# Patient Record
Sex: FEMALE | Race: WHITE | URBAN - METROPOLITAN AREA
[De-identification: names, ages, dates, MRNs, and addresses within clinical notes are randomized per-mention and may not be internally consistent; named-entity substitution may affect disease eponyms.]

---

## 2018-12-02 ENCOUNTER — EMERGENCY (EMERGENCY)
Facility: HOSPITAL | Age: 53
LOS: 0 days | Discharge: HOME | End: 2018-12-03
Attending: EMERGENCY MEDICINE

## 2018-12-02 VITALS
RESPIRATION RATE: 18 BRPM | TEMPERATURE: 98 F | OXYGEN SATURATION: 99 % | DIASTOLIC BLOOD PRESSURE: 89 MMHG | HEART RATE: 88 BPM | SYSTOLIC BLOOD PRESSURE: 136 MMHG

## 2018-12-02 DIAGNOSIS — M54.9 DORSALGIA, UNSPECIFIED: ICD-10-CM

## 2018-12-02 DIAGNOSIS — C50.919 MALIGNANT NEOPLASM OF UNSPECIFIED SITE OF UNSPECIFIED FEMALE BREAST: ICD-10-CM

## 2018-12-02 DIAGNOSIS — R07.89 OTHER CHEST PAIN: ICD-10-CM

## 2018-12-02 DIAGNOSIS — R07.9 CHEST PAIN, UNSPECIFIED: ICD-10-CM

## 2018-12-02 LAB
ALBUMIN SERPL ELPH-MCNC: 4.4 G/DL — SIGNIFICANT CHANGE UP (ref 3.5–5.2)
ALP SERPL-CCNC: 51 U/L — SIGNIFICANT CHANGE UP (ref 30–115)
ALT FLD-CCNC: 18 U/L — SIGNIFICANT CHANGE UP (ref 0–41)
ANION GAP SERPL CALC-SCNC: 13 MMOL/L — SIGNIFICANT CHANGE UP (ref 7–14)
AST SERPL-CCNC: 19 U/L — SIGNIFICANT CHANGE UP (ref 0–41)
BASOPHILS # BLD AUTO: 0.03 K/UL — SIGNIFICANT CHANGE UP (ref 0–0.2)
BASOPHILS NFR BLD AUTO: 0.6 % — SIGNIFICANT CHANGE UP (ref 0–1)
BILIRUB SERPL-MCNC: 0.6 MG/DL — SIGNIFICANT CHANGE UP (ref 0.2–1.2)
BUN SERPL-MCNC: 11 MG/DL — SIGNIFICANT CHANGE UP (ref 10–20)
CALCIUM SERPL-MCNC: 9.5 MG/DL — SIGNIFICANT CHANGE UP (ref 8.5–10.1)
CHLORIDE SERPL-SCNC: 101 MMOL/L — SIGNIFICANT CHANGE UP (ref 98–110)
CO2 SERPL-SCNC: 27 MMOL/L — SIGNIFICANT CHANGE UP (ref 17–32)
CREAT SERPL-MCNC: 0.7 MG/DL — SIGNIFICANT CHANGE UP (ref 0.7–1.5)
D DIMER BLD IA.RAPID-MCNC: 41 NG/ML DDU — SIGNIFICANT CHANGE UP (ref 0–230)
EOSINOPHIL # BLD AUTO: 0.11 K/UL — SIGNIFICANT CHANGE UP (ref 0–0.7)
EOSINOPHIL NFR BLD AUTO: 2.1 % — SIGNIFICANT CHANGE UP (ref 0–8)
GLUCOSE SERPL-MCNC: 86 MG/DL — SIGNIFICANT CHANGE UP (ref 70–99)
HCG SERPL QL: NEGATIVE — SIGNIFICANT CHANGE UP
HCT VFR BLD CALC: 43.8 % — SIGNIFICANT CHANGE UP (ref 37–47)
HGB BLD-MCNC: 14.7 G/DL — SIGNIFICANT CHANGE UP (ref 12–16)
IMM GRANULOCYTES NFR BLD AUTO: 0.2 % — SIGNIFICANT CHANGE UP (ref 0.1–0.3)
LYMPHOCYTES # BLD AUTO: 1.13 K/UL — LOW (ref 1.2–3.4)
LYMPHOCYTES # BLD AUTO: 21.7 % — SIGNIFICANT CHANGE UP (ref 20.5–51.1)
MCHC RBC-ENTMCNC: 29.7 PG — SIGNIFICANT CHANGE UP (ref 27–31)
MCHC RBC-ENTMCNC: 33.6 G/DL — SIGNIFICANT CHANGE UP (ref 32–37)
MCV RBC AUTO: 88.5 FL — SIGNIFICANT CHANGE UP (ref 81–99)
MONOCYTES # BLD AUTO: 0.45 K/UL — SIGNIFICANT CHANGE UP (ref 0.1–0.6)
MONOCYTES NFR BLD AUTO: 8.7 % — SIGNIFICANT CHANGE UP (ref 1.7–9.3)
NEUTROPHILS # BLD AUTO: 3.47 K/UL — SIGNIFICANT CHANGE UP (ref 1.4–6.5)
NEUTROPHILS NFR BLD AUTO: 66.7 % — SIGNIFICANT CHANGE UP (ref 42.2–75.2)
NT-PROBNP SERPL-SCNC: 89 PG/ML — SIGNIFICANT CHANGE UP (ref 0–300)
PLATELET # BLD AUTO: 294 K/UL — SIGNIFICANT CHANGE UP (ref 130–400)
POTASSIUM SERPL-MCNC: 4.4 MMOL/L — SIGNIFICANT CHANGE UP (ref 3.5–5)
POTASSIUM SERPL-SCNC: 4.4 MMOL/L — SIGNIFICANT CHANGE UP (ref 3.5–5)
PROT SERPL-MCNC: 7.1 G/DL — SIGNIFICANT CHANGE UP (ref 6–8)
RBC # BLD: 4.95 M/UL — SIGNIFICANT CHANGE UP (ref 4.2–5.4)
RBC # FLD: 12.4 % — SIGNIFICANT CHANGE UP (ref 11.5–14.5)
SODIUM SERPL-SCNC: 141 MMOL/L — SIGNIFICANT CHANGE UP (ref 135–146)
TROPONIN T SERPL-MCNC: <0.01 NG/ML — SIGNIFICANT CHANGE UP
TROPONIN T SERPL-MCNC: <0.01 NG/ML — SIGNIFICANT CHANGE UP
WBC # BLD: 5.2 K/UL — SIGNIFICANT CHANGE UP (ref 4.8–10.8)
WBC # FLD AUTO: 5.2 K/UL — SIGNIFICANT CHANGE UP (ref 4.8–10.8)

## 2018-12-02 RX ORDER — ASPIRIN/CALCIUM CARB/MAGNESIUM 324 MG
325 TABLET ORAL ONCE
Qty: 0 | Refills: 0 | Status: COMPLETED | OUTPATIENT
Start: 2018-12-02 | End: 2018-12-02

## 2018-12-02 RX ORDER — TAMOXIFEN CITRATE 20 MG/1
20 TABLET, FILM COATED ORAL AT BEDTIME
Qty: 0 | Refills: 0 | Status: DISCONTINUED | OUTPATIENT
Start: 2018-12-02 | End: 2018-12-03

## 2018-12-02 RX ADMIN — Medication 325 MILLIGRAM(S): at 10:37

## 2018-12-02 NOTE — ED PROVIDER NOTE - ATTENDING CONTRIBUTION TO CARE
Agree w/ above.  IMP: back pain and chest discomfort.  Does not seem like dissection; pe and acs seem unlikely.  P: ekg, cxr, cbc, cmp, ddimer, ce, reassess.

## 2018-12-02 NOTE — ED PROVIDER NOTE - NS ED ROS FT
Constitutional: No fever  Eyes:  No visual changes  ENMT: No neck pain or stiffness  Cardiac:  See HPI  Respiratory:  No cough or respiratory distress.   GI:  No nausea, vomiting, diarrhea or abdominal pain.  :  No dysuria, frequency or burning.  MS:  See HPI  Neuro:  No headache   Skin:  No skin rash  Except as documented in the HPI,  all other systems are negative

## 2018-12-02 NOTE — ED ADULT NURSE REASSESSMENT NOTE - NS ED NURSE REASSESS COMMENT FT1
. Patient is alert,oriented . VSS . On continous cardiac monitering . Denied any pain now .Will continue to moniter

## 2018-12-02 NOTE — ED PROVIDER NOTE - MEDICAL DECISION MAKING DETAILS
trop neg. ddimer neg. ekg non ischemic.  cxr wnl.  Pt continued to have chest "fluttering" and back discomfort, though improved.  Pt placed in OBS for further w/up in light of diagnostic uncertainty.  Discussed all available results.  Pt and/ or family understand results and agree w/ plan of care.

## 2018-12-02 NOTE — ED CDU PROVIDER INITIAL DAY NOTE - PROGRESS NOTE DETAILS
Pt endorsed to Dr. Kaur for further care. One week of occas back pain and chest pains. Pt has some parathoracic tenderness. S1S2 rrr lungs clear, no ext swelling and neuro intact. EKG non ischemic, normal xray. CE neg x2 . CCTA in the am. received signout from Dr. Kaur - pt in obs for cp/back; currently pain free; ce/ekg unchanged x 2; will plan for ccta in am;

## 2018-12-02 NOTE — ED CDU PROVIDER INITIAL DAY NOTE - OBJECTIVE STATEMENT
54 y/o F pmh early stage breast CA s/p lumpectomy, on tamoxifen, p/w intermittent back pain x1wk.  Pain worse supine, better seated.  + "fluttering" in chest today, prompting ED visit.  No n/v, cough, fever, leg swelling, travel, neck pain, neuro deficit.  gradual onset w/o hx trauma.  No prior cardiac w/up.  sx moderated.

## 2018-12-02 NOTE — ED ADULT NURSE NOTE - OBJECTIVE STATEMENT
Patient c/o chest pressure and pain in between her shoulder blades intermittently x 1 week. Stated she went out last night and mixed sushi and wine and had 1 episode of vomiting but is not sure if it is related.   Patient denies SOB Fevers chills, head ache visual changes.

## 2018-12-02 NOTE — ED PROVIDER NOTE - PHYSICAL EXAMINATION
CONSTITUTIONAL: NAD  SKIN: Warm dry  HEAD: NCAT  EYES: NL inspection  ENT: MMM  NECK: Supple; non tender.  CARD: RRR, 2+ pulses x 4 extrem, symmetric  RESP: CTAB  ABD: S/NT no R/G  BACK: Nl inspection, non ttp  EXT: no pedal edema  NEURO: Grossly unremarkable  PSYCH: Cooperative

## 2018-12-02 NOTE — ED CDU PROVIDER INITIAL DAY NOTE - MEDICAL DECISION MAKING DETAILS
Obs care continued by me, Dr. Oconnor. No further CDU events. 2nd trop neg.  ekg unchanged, non ischemic. Will cont to monitor.

## 2018-12-02 NOTE — ED PROVIDER NOTE - OBJECTIVE STATEMENT
52 y/o F pmh early stage breast CA s/p lumpectomy, on tamoxifen, p/w intermittent back pain x1wk.  Pain worse supine, better seated.  + "fluttering" in chest today, prompting ED visit.  No n/v, cough, fever, leg swelling, travel, neck pain, neuro deficit.  gradual onset w/o hx trauma.  No prior cardiac w/up.  sx moderated.

## 2018-12-03 VITALS
DIASTOLIC BLOOD PRESSURE: 74 MMHG | SYSTOLIC BLOOD PRESSURE: 124 MMHG | HEART RATE: 84 BPM | TEMPERATURE: 96 F | RESPIRATION RATE: 18 BRPM | OXYGEN SATURATION: 98 %

## 2018-12-03 RX ORDER — METOPROLOL TARTRATE 50 MG
50 TABLET ORAL ONCE
Qty: 0 | Refills: 0 | Status: COMPLETED | OUTPATIENT
Start: 2018-12-03 | End: 2018-12-03

## 2018-12-03 RX ORDER — KETOROLAC TROMETHAMINE 30 MG/ML
30 SYRINGE (ML) INJECTION ONCE
Qty: 0 | Refills: 0 | Status: DISCONTINUED | OUTPATIENT
Start: 2018-12-03 | End: 2018-12-03

## 2018-12-03 RX ADMIN — Medication 30 MILLIGRAM(S): at 11:35

## 2018-12-03 RX ADMIN — Medication 50 MILLIGRAM(S): at 08:17

## 2018-12-03 RX ADMIN — TAMOXIFEN CITRATE 20 MILLIGRAM(S): 20 TABLET, FILM COATED ORAL at 03:24

## 2018-12-03 NOTE — ED CDU PROVIDER SUBSEQUENT DAY NOTE - HISTORY
pt resting in obs without complaints; ce/trop negative x 2; will start with lopressor 50mg po this am - ccta

## 2018-12-03 NOTE — ED CDU PROVIDER DISPOSITION NOTE - CLINICAL COURSE
Patient was sent to EDOU for further evaluation of atypical chest pains, has remained in no distress and with stable vitals, ekgs times two no evidence of ischemic changes, enzymes times two normal, CCTA read as normal coronaries, Copies of all blood work and other studies were given to patient and family, will fallow up with both PMD and GI

## 2023-11-17 PROBLEM — Z00.00 ENCOUNTER FOR PREVENTIVE HEALTH EXAMINATION: Status: ACTIVE | Noted: 2023-11-17

## 2024-05-14 PROBLEM — C50.919 MALIGNANT NEOPLASM OF UNSPECIFIED SITE OF UNSPECIFIED FEMALE BREAST: Chronic | Status: ACTIVE | Noted: 2018-12-02

## 2024-05-16 ENCOUNTER — OUTPATIENT (OUTPATIENT)
Dept: OUTPATIENT SERVICES | Facility: HOSPITAL | Age: 59
LOS: 1 days | End: 2024-05-16
Payer: COMMERCIAL

## 2024-05-16 DIAGNOSIS — R10.84 GENERALIZED ABDOMINAL PAIN: ICD-10-CM

## 2024-05-16 DIAGNOSIS — Z00.8 ENCOUNTER FOR OTHER GENERAL EXAMINATION: ICD-10-CM

## 2024-05-16 PROCEDURE — 76856 US EXAM PELVIC COMPLETE: CPT | Mod: 26

## 2024-05-16 PROCEDURE — 76856 US EXAM PELVIC COMPLETE: CPT

## 2024-05-16 PROCEDURE — 76700 US EXAM ABDOM COMPLETE: CPT | Mod: 26

## 2024-05-16 PROCEDURE — 76700 US EXAM ABDOM COMPLETE: CPT

## 2024-05-17 ENCOUNTER — EMERGENCY (EMERGENCY)
Facility: HOSPITAL | Age: 59
LOS: 0 days | Discharge: HOME | End: 2024-05-17
Attending: EMERGENCY MEDICINE
Payer: COMMERCIAL

## 2024-05-17 VITALS
DIASTOLIC BLOOD PRESSURE: 94 MMHG | OXYGEN SATURATION: 100 % | HEART RATE: 86 BPM | TEMPERATURE: 98 F | RESPIRATION RATE: 18 BRPM | SYSTOLIC BLOOD PRESSURE: 159 MMHG

## 2024-05-17 DIAGNOSIS — R10.31 RIGHT LOWER QUADRANT PAIN: ICD-10-CM

## 2024-05-17 DIAGNOSIS — R10.84 GENERALIZED ABDOMINAL PAIN: ICD-10-CM

## 2024-05-17 DIAGNOSIS — Z90.49 ACQUIRED ABSENCE OF OTHER SPECIFIED PARTS OF DIGESTIVE TRACT: ICD-10-CM

## 2024-05-17 DIAGNOSIS — Z87.891 PERSONAL HISTORY OF NICOTINE DEPENDENCE: ICD-10-CM

## 2024-05-17 DIAGNOSIS — I44.4 LEFT ANTERIOR FASCICULAR BLOCK: ICD-10-CM

## 2024-05-17 DIAGNOSIS — E78.00 PURE HYPERCHOLESTEROLEMIA, UNSPECIFIED: ICD-10-CM

## 2024-05-17 DIAGNOSIS — R10.13 EPIGASTRIC PAIN: ICD-10-CM

## 2024-05-17 DIAGNOSIS — R10.32 LEFT LOWER QUADRANT PAIN: ICD-10-CM

## 2024-05-17 DIAGNOSIS — R00.2 PALPITATIONS: ICD-10-CM

## 2024-05-17 LAB
ALBUMIN SERPL ELPH-MCNC: 4.5 G/DL — SIGNIFICANT CHANGE UP (ref 3.5–5.2)
ALP SERPL-CCNC: 71 U/L — SIGNIFICANT CHANGE UP (ref 30–115)
ALT FLD-CCNC: 21 U/L — SIGNIFICANT CHANGE UP (ref 0–41)
ANION GAP SERPL CALC-SCNC: 10 MMOL/L — SIGNIFICANT CHANGE UP (ref 7–14)
APPEARANCE UR: CLEAR — SIGNIFICANT CHANGE UP
AST SERPL-CCNC: 20 U/L — SIGNIFICANT CHANGE UP (ref 0–41)
BACTERIA # UR AUTO: NEGATIVE /HPF — SIGNIFICANT CHANGE UP
BASOPHILS # BLD AUTO: 0.01 K/UL — SIGNIFICANT CHANGE UP (ref 0–0.2)
BASOPHILS NFR BLD AUTO: 0.2 % — SIGNIFICANT CHANGE UP (ref 0–1)
BILIRUB SERPL-MCNC: 0.9 MG/DL — SIGNIFICANT CHANGE UP (ref 0.2–1.2)
BILIRUB UR-MCNC: NEGATIVE — SIGNIFICANT CHANGE UP
BUN SERPL-MCNC: 11 MG/DL — SIGNIFICANT CHANGE UP (ref 10–20)
CALCIUM SERPL-MCNC: 9.5 MG/DL — SIGNIFICANT CHANGE UP (ref 8.4–10.5)
CAST: 0 /LPF — SIGNIFICANT CHANGE UP (ref 0–4)
CHLORIDE SERPL-SCNC: 103 MMOL/L — SIGNIFICANT CHANGE UP (ref 98–110)
CO2 SERPL-SCNC: 25 MMOL/L — SIGNIFICANT CHANGE UP (ref 17–32)
COLOR SPEC: YELLOW — SIGNIFICANT CHANGE UP
CREAT SERPL-MCNC: 0.7 MG/DL — SIGNIFICANT CHANGE UP (ref 0.7–1.5)
DIFF PNL FLD: NEGATIVE — SIGNIFICANT CHANGE UP
EGFR: 101 ML/MIN/1.73M2 — SIGNIFICANT CHANGE UP
EOSINOPHIL # BLD AUTO: 0.13 K/UL — SIGNIFICANT CHANGE UP (ref 0–0.7)
EOSINOPHIL NFR BLD AUTO: 2.3 % — SIGNIFICANT CHANGE UP (ref 0–8)
GLUCOSE SERPL-MCNC: 98 MG/DL — SIGNIFICANT CHANGE UP (ref 70–99)
GLUCOSE UR QL: NEGATIVE MG/DL — SIGNIFICANT CHANGE UP
HCT VFR BLD CALC: 41.7 % — SIGNIFICANT CHANGE UP (ref 37–47)
HGB BLD-MCNC: 14.5 G/DL — SIGNIFICANT CHANGE UP (ref 12–16)
IMM GRANULOCYTES NFR BLD AUTO: 0.2 % — SIGNIFICANT CHANGE UP (ref 0.1–0.3)
KETONES UR-MCNC: NEGATIVE MG/DL — SIGNIFICANT CHANGE UP
LACTATE SERPL-SCNC: 1.2 MMOL/L — SIGNIFICANT CHANGE UP (ref 0.7–2)
LEUKOCYTE ESTERASE UR-ACNC: ABNORMAL
LIDOCAIN IGE QN: 20 U/L — SIGNIFICANT CHANGE UP (ref 7–60)
LYMPHOCYTES # BLD AUTO: 0.9 K/UL — LOW (ref 1.2–3.4)
LYMPHOCYTES # BLD AUTO: 16.2 % — LOW (ref 20.5–51.1)
MCHC RBC-ENTMCNC: 31 PG — SIGNIFICANT CHANGE UP (ref 27–31)
MCHC RBC-ENTMCNC: 34.8 G/DL — SIGNIFICANT CHANGE UP (ref 32–37)
MCV RBC AUTO: 89.1 FL — SIGNIFICANT CHANGE UP (ref 81–99)
MONOCYTES # BLD AUTO: 0.34 K/UL — SIGNIFICANT CHANGE UP (ref 0.1–0.6)
MONOCYTES NFR BLD AUTO: 6.1 % — SIGNIFICANT CHANGE UP (ref 1.7–9.3)
NEUTROPHILS # BLD AUTO: 4.16 K/UL — SIGNIFICANT CHANGE UP (ref 1.4–6.5)
NEUTROPHILS NFR BLD AUTO: 75 % — SIGNIFICANT CHANGE UP (ref 42.2–75.2)
NITRITE UR-MCNC: NEGATIVE — SIGNIFICANT CHANGE UP
NRBC # BLD: 0 /100 WBCS — SIGNIFICANT CHANGE UP (ref 0–0)
PH UR: 6.5 — SIGNIFICANT CHANGE UP (ref 5–8)
PLATELET # BLD AUTO: 284 K/UL — SIGNIFICANT CHANGE UP (ref 130–400)
PMV BLD: 10.5 FL — HIGH (ref 7.4–10.4)
POTASSIUM SERPL-MCNC: 4 MMOL/L — SIGNIFICANT CHANGE UP (ref 3.5–5)
POTASSIUM SERPL-SCNC: 4 MMOL/L — SIGNIFICANT CHANGE UP (ref 3.5–5)
PROT SERPL-MCNC: 6.5 G/DL — SIGNIFICANT CHANGE UP (ref 6–8)
PROT UR-MCNC: NEGATIVE MG/DL — SIGNIFICANT CHANGE UP
RBC # BLD: 4.68 M/UL — SIGNIFICANT CHANGE UP (ref 4.2–5.4)
RBC # FLD: 12 % — SIGNIFICANT CHANGE UP (ref 11.5–14.5)
RBC CASTS # UR COMP ASSIST: 0 /HPF — SIGNIFICANT CHANGE UP (ref 0–4)
SODIUM SERPL-SCNC: 138 MMOL/L — SIGNIFICANT CHANGE UP (ref 135–146)
SP GR SPEC: <1.005 — LOW (ref 1–1.03)
SQUAMOUS # UR AUTO: 0 /HPF — SIGNIFICANT CHANGE UP (ref 0–5)
TROPONIN T, HIGH SENSITIVITY RESULT: <6 NG/L — SIGNIFICANT CHANGE UP (ref 6–13)
UROBILINOGEN FLD QL: 0.2 MG/DL — SIGNIFICANT CHANGE UP (ref 0.2–1)
WBC # BLD: 5.55 K/UL — SIGNIFICANT CHANGE UP (ref 4.8–10.8)
WBC # FLD AUTO: 5.55 K/UL — SIGNIFICANT CHANGE UP (ref 4.8–10.8)
WBC UR QL: 1 /HPF — SIGNIFICANT CHANGE UP (ref 0–5)

## 2024-05-17 PROCEDURE — 84484 ASSAY OF TROPONIN QUANT: CPT

## 2024-05-17 PROCEDURE — 83605 ASSAY OF LACTIC ACID: CPT

## 2024-05-17 PROCEDURE — 96374 THER/PROPH/DIAG INJ IV PUSH: CPT | Mod: XU

## 2024-05-17 PROCEDURE — 93005 ELECTROCARDIOGRAM TRACING: CPT

## 2024-05-17 PROCEDURE — 36415 COLL VENOUS BLD VENIPUNCTURE: CPT

## 2024-05-17 PROCEDURE — 83690 ASSAY OF LIPASE: CPT

## 2024-05-17 PROCEDURE — 80053 COMPREHEN METABOLIC PANEL: CPT

## 2024-05-17 PROCEDURE — 81001 URINALYSIS AUTO W/SCOPE: CPT

## 2024-05-17 PROCEDURE — 85025 COMPLETE CBC W/AUTO DIFF WBC: CPT

## 2024-05-17 PROCEDURE — 93010 ELECTROCARDIOGRAM REPORT: CPT

## 2024-05-17 PROCEDURE — 74177 CT ABD & PELVIS W/CONTRAST: CPT | Mod: 26,MC

## 2024-05-17 PROCEDURE — 99285 EMERGENCY DEPT VISIT HI MDM: CPT

## 2024-05-17 PROCEDURE — 99285 EMERGENCY DEPT VISIT HI MDM: CPT | Mod: 25

## 2024-05-17 PROCEDURE — 74177 CT ABD & PELVIS W/CONTRAST: CPT | Mod: MC

## 2024-05-17 RX ORDER — FAMOTIDINE 10 MG/ML
20 INJECTION INTRAVENOUS ONCE
Refills: 0 | Status: COMPLETED | OUTPATIENT
Start: 2024-05-17 | End: 2024-05-17

## 2024-05-17 RX ORDER — SODIUM CHLORIDE 9 MG/ML
1000 INJECTION INTRAMUSCULAR; INTRAVENOUS; SUBCUTANEOUS ONCE
Refills: 0 | Status: COMPLETED | OUTPATIENT
Start: 2024-05-17 | End: 2024-05-17

## 2024-05-17 RX ADMIN — SODIUM CHLORIDE 1000 MILLILITER(S): 9 INJECTION INTRAMUSCULAR; INTRAVENOUS; SUBCUTANEOUS at 11:44

## 2024-05-17 RX ADMIN — FAMOTIDINE 20 MILLIGRAM(S): 10 INJECTION INTRAVENOUS at 11:44

## 2024-05-17 NOTE — ED PROVIDER NOTE - CLINICAL SUMMARY MEDICAL DECISION MAKING FREE TEXT BOX
Abdominal discomfort x 2 weeks.  Palpitations elevated blood pressure x 1 day.  Resolved.  CCTA from 2018 negative.    My independent interpretation - Dr. Patric Best - of the following studies labs, imaging, ekg that showed: No evidence of ischemia    Appropriate medications for patient's presenting complaints were ordered and effects were reassessed.   Patient patient ultrasound reports reviewed by me..  Additional history obtained from patient's son.    Escalation to admission and/or observation was considered.  Patient feels much better and is comfortable with discharge.  Patient is a good candidate to attempt outpatient management. Supportive care and home care discussed in detail. Patient aware they may have to return for re-evaluation and possible admission if outpatient treatment fails. Strict return precautions discussed. Appropriate follow-up was arranged.

## 2024-05-17 NOTE — ED PROVIDER NOTE - PATIENT PORTAL LINK FT
You can access the FollowMyHealth Patient Portal offered by [facility] by registering at the following website: [email or web address]. By joining [employer]’s FollowMyHealth portal, you will also be able to view your health information using other applications (apps) compatible with our system.

## 2024-05-17 NOTE — ED PROVIDER NOTE - PROGRESS NOTE DETAILS
Labs and imaging reviewed with pt. given good instructions when to return to ED and importance of f/u.  All incidental findings will discharge were discussed with patient including the hepatic cyst.  Pt verbalized understanding. patient was given opportunity to ask questions.  Patient will follow-up with her GI doctor next week.

## 2024-05-17 NOTE — ED PROVIDER NOTE - OBJECTIVE STATEMENT
58-year-old female history of palpitations on metoprolol, high cholesterol, status post cholecystectomy, had episodes of elevated blood pressure in the past, status post endoscopy 3 weeks ago, has been complaining of generalized abdominal pain over last 2 weeks.  Seen her GI Dr. Morales.  Referred her to get an ultrasound.  Had ultrasound done yesterday.  Results unknown.  I reviewed the results from yesterday.  All negative.  Today had an episode of high blood pressure and palpitations and mild twinge in her left shoulder.  Not diaphoretic no  chest pain.

## 2024-05-17 NOTE — ED ADULT NURSE NOTE - NSFALLUNIVINTERV_ED_ALL_ED
Bed/Stretcher in lowest position, wheels locked, appropriate side rails in place/Call bell, personal items and telephone in reach/Instruct patient to call for assistance before getting out of bed/chair/stretcher/Non-slip footwear applied when patient is off stretcher/[place] to call system/Physically safe environment - no spills, clutter or unnecessary equipment/Purposeful proactive rounding/Room/bathroom lighting operational, light cord in reach

## 2024-05-17 NOTE — ED PROVIDER NOTE - CARE PROVIDER_API CALL
Yossi Morales.  Gastroenterology  [address] [place] [id number]  Phone: [phone]  Fax: [phone]  Follow Up Time: 4-6 Days

## 2024-05-17 NOTE — ED PROVIDER NOTE - INTERPRETATION
ED EKG: my independent interpretation - Dr. Patric Best : Sinus rhythm 83, left axis deviation, left anterior hemiblock, no ST elevations no T inversions

## 2024-05-17 NOTE — ED PROVIDER NOTE - PHYSICAL EXAMINATION
VITAL SIGNS: I have reviewed nursing notes and confirm.  CONSTITUTIONAL: non-toxic, well appearing  SKIN: no rash, no petechiae.  EYES: Pink conjunctiva, anicteric  ENT: tongue midline, MMM  NECK: Supple; no crepitus over neck or chest, no JVD  CARD: RRR, no murmurs, equal radial pulses bilaterally 2+  RESP: CTAB, no respiratory distress  ABD: Soft, mild right lower and left lower quadrant tenderness and epigastric tenderness, non-distended, no peritoneal signs, no HSM, no CVA tenderness    EXT: Normal ROM x4. No edema. No calves tenderness  NEURO: Alert, oriented. CN2-12 intact, equal strength bilaterally, nl gait.  PSYCH: Cooperative, appropriate.

## 2024-07-11 ENCOUNTER — OUTPATIENT (OUTPATIENT)
Dept: OUTPATIENT SERVICES | Facility: HOSPITAL | Age: 59
LOS: 1 days | End: 2024-07-11
Payer: COMMERCIAL

## 2024-07-11 DIAGNOSIS — Z00.8 ENCOUNTER FOR OTHER GENERAL EXAMINATION: ICD-10-CM

## 2024-07-11 DIAGNOSIS — R07.9 CHEST PAIN, UNSPECIFIED: ICD-10-CM

## 2024-07-11 PROCEDURE — 75574 CT ANGIO HRT W/3D IMAGE: CPT | Mod: 26

## 2024-07-11 PROCEDURE — 75574 CT ANGIO HRT W/3D IMAGE: CPT

## 2024-07-12 DIAGNOSIS — R07.9 CHEST PAIN, UNSPECIFIED: ICD-10-CM

## 2025-05-06 NOTE — ED ADULT TRIAGE NOTE - MODE OF ARRIVAL
Chronic patient Established Note (Follow up visit)      SUBJECTIVE:  INTERVAL HISTORY 5/6/2025:  Ms. Cooley returns for lower back pain. Current Pain level is 5/10.  She reports that her pain is on both sides of her back. She reports she had excellent relief with her last epidural and is interested in repeating this again.  She did have an CT scan which demonstrates further compression at L3.      INTERVAL HISTORY 12/17/2024:  Ms. Cooley returns for lower back pain. Current Pain level is 1/10.  She reports that the pain she was having down the leg is completely gone.  She does report that she has persistent pain in the back and buttocks.  She reports she is working with home health physical therapy and she feels soreness after her sessions.  She feels this is helping a great deal.  She feels the pain is more of a discomfort.  She feels her mobility has also improved.  She was also started on forteo injections for improving her bone quality.     INTERVAL HISTORY 10/29/2024:  Ms. Cooley returns for lower back pain with left leg radiation. Current Pain level is 3/10.  She reports that she has been having good days and bad days.  She has been working with physical therapy and she feels this has been feeling overall improvement in her pain. She does feel that after her therapy, her pain does get worse initially, however she has been taking her Norco 5-325mg as needed, which has also been helping.    INTERVAL HISTORY 10/1/2024:  Betzy Cooley presents to the clinic for a follow-up appointment for chronic pain. Current pain intensity is 10/10.  Since the last visit, Betzy Cooley states:  she reports that she underwent a CT scan which significantly worsened in her pain.  She feels her pain is in her lower back and radiates down her left leg.  She reports that since her last visit, she stopped therapy and feels her pain has since worsened.     PRIOR HISTORY:   Betzy Cooley presents to the clinic for the evaluation of  lower back pain and left leg pain. The pain started about 4 months ago following a fall on the right side and symptoms have been improving.The pain is located in the lower back area and radiates to the left leg (toward the foot).  The pain is described as numbing and tight band and is rated as 0/10. The pain is rated with a score of  0/10 on the BEST day and a score of 4/10 on the WORST day.  Symptoms interfere with daily activity. The pain is exacerbated by Walking.  The pain is mitigated by rest.     Patient denies urinary incontinence and bowel incontinence. She and her  report the fall occurred after she had stents placed.  She does report her pain has progressively improved. She feels the LSO brace doesn't really help her pain, but she doesn't feel the pain severely limits her life.    Physical Therapy/Home Exercise: no      Pain Disability Index Review:      5/6/2025     1:48 PM 12/17/2024     2:34 PM 10/29/2024    10:41 AM   Last 3 PDI Scores   Pain Disability Index (PDI) 56 0 28       Pain Medications:   - tylenol (helps a great deal)     report:  Reviewed and consistent with medication use as prescribed.    Pain Procedures:   11/22/2024 - Left L3/4 and L4/5 TFESI    Imaging:   MRI LUMBAR SPINE WITHOUT CONTRAST     CLINICAL HISTORY:  Spine fracture, lumbar, pathological;     TECHNIQUE:  Multiplanar, multisequence MR images were acquired from the thoracolumbar junction to the sacrum without contrast.     COMPARISON:  CTA 08/21/2024     FINDINGS:  Alignment: Grade 1 anterolisthesis L4 on L5.  Retropulsion of L1 vertebral body.     Vertebrae: Compression fracture of the L1 vertebral body with marked height loss and 6 mm retropulsion.  Essentially complete height loss of the ventral and central aspect of the vertebral body.  Patchy edema-like signal within the L1 vertebra would in keeping with recent fracture.  No large paraspinous hematoma.  No definite intraspinal hemorrhage.  Elsewhere in the lumbar  spine, centrally normal marrow signal for age; no endplate edema-like signal.     Discs: Disc desiccation and height loss at L2-L3 and L3-L4.     Cord: Conus terminates at L1-L2 and appears unremarkable.  Cauda equina appears unremarkable.     Degenerative findings:     *T12-L1: Compression fracture of the L1 vertebral body with retropulsion resulting in no more than moderate bilateral neural foraminal narrowing and moderate spinal canal stenosis.  *L1-L2: Compression fracture of the L1 vertebral body with retropulsion resulting in moderate bilateral neural foraminal narrowing and mild spinal canal stenosis.  Minimal AP thecal sac diameter approximately 6.9 mm at level of maximum compression.  *L2-L3: Facet arthropathy and circumferential disc bulge resulting in mild bilateral neural foraminal narrowing and mild spinal canal stenosis.  *L3-L4: Facet arthropathy and ligamentum flavum buckling resulting in moderate bilateral neural foraminal narrowing and mild spinal canal stenosis.  *L4-L5: Grade 1 anterolisthesis, facet arthropathy and ligamentum flavum buckling with unroofing of the intervertebral disc resulting in severe bilateral subarticular recess stenosis (with encroachment and possible contact of traversing L5 nerve roots), mild left neural foraminal narrowing and moderate spinal canal stenosis.  *L5-S1: Facet arthropathy resulting in mild bilateral neural foraminal narrowing.  No spinal canal stenosis.  Paraspinal muscles & soft tissues: Unremarkable.     Impression:     L1 vertebral body compression fracture resulting in vertebra plana configuration with 6 mm retropulsion.  Finding results in no more than moderate spinal canal stenosis at T12-L1 and moderate bilateral neural foraminal narrowing at L1-L2.     Multilevel lumbar spondylosis, as characterized above, notable for moderate bilateral neural foraminal narrowing at L3-L4, severe bilateral subarticular recess stenosis at L4-L5 and moderate spinal  canal stenosis at L4-L5.     Additional details, as provided in the body of report.     Electronically signed by resident: Malcolm Vazquez  Date:                                            08/22/2024  Time:                                           06:19     Electronically signed by:Kenneth Dillon  Date:                                            08/22/2024  Time:                                           08:22                XR LUMBAR SPINE LATERAL SUPINE AND LATERAL UPRIGHT     CLINICAL HISTORY:  L1 burst fracture;     TECHNIQUE:  Two views of the lumbar spine were obtained, with lateral views performed weight-bearing and non weight-bearing.     COMPARISON:  Reference is made to the CT examination of 08/21/2024 and to an MR exam of 08/22/2024 and a prior conventional radiographic exam of the lumbar spine dated 05/09/2024.     FINDINGS:  Severe compression deformity with marked loss of height involving the L1 vertebral body is again observed, unchanged from the 08/21/2024 CT examination but representing a significant interval loss of height of L1 since the older conventional radiograph of 05/29/2024.  There is a modest degree of retropulsion of the posterior aspect of this markedly compressed L1 vertebral body noted, as was optimally demonstrated on the prior CT and MR examinations referenced above.  9 mm of anterolisthesis of L4 in relation to L5 is appreciated, as is a lesser degree of anterolisthesis of L5 in relation to S1, secondary to facet arthropathy at these levels.  Alignment elsewhere in the lumbar and visualized lower thoracic spine appears unremarkable.  Aside from L1, the visualized vertebral body heights are normally maintained without compression deformity.  Disc narrowing is seen at every level from L1-2 through L4-5.  Vertebral endplates appear well defined.  No osseous destructive process.     Impression:     1. Compression deformity with severe loss of height of the L1 vertebral body with a  modest degree of retropulsion of the posterior aspect of this compressed vertebral body.  The appearance is similar to the recent CT and MR examinations, although it does represent a detrimental change since of earlier conventional radiograph of 05/29/2024.  2. Anterolisthesis of L4 in relation to L5 and L5 in relation to S1, secondary to facet arthropathy at each level.  3. No alignment change between the supine and weight-bearing images is observed.        Electronically signed by:Bravo Medina MD  Date:                                            08/22/2024  Time:                                           09:32    Past Medical History:   Diagnosis Date    Arthritis     Cataract     GERD (gastroesophageal reflux disease)     HEARING LOSS     Hypertension     Osteoporosis, postmenopausal     Squamous Cell Carcinoma 2007    right forearm    Urinary incontinence     rare mixed     Past Surgical History:   Procedure Laterality Date    ANGIOGRAM, CORONARY, WITH LEFT HEART CATHETERIZATION Bilateral 05/17/2024    Procedure: Angiogram, Coronary, with Left Heart Cath;  Surgeon: Miguel Jacob MD;  Location: [facility] CATH LAB;  Service: Cardiology;  Laterality: Bilateral;    cardiac stents  04/01/2024    COLPOPEXY N/A 08/06/2019    Procedure: COLPOPEXY SSL FIXATION;  Surgeon: Alma Dorsey MD;  [facility];  Service: Urology;  Laterality: N/A;    CORONARY ANGIOGRAPHY Left 05/16/2024    Procedure: ANGIOGRAM, CORONARY ARTERY;  Surgeon: Miguel Jacob MD;  Location: [facility] CATH LAB;  Service: Cardiology;  Laterality: Left;    CYSTOSCOPY N/A 08/06/2019    Procedure: CYSTOSCOPY;  Surgeon: Alma Dorsey MD;  [facility];  Service: Urology;  Laterality: N/A;    ESOPHAGOGASTRODUODENOSCOPY N/A 04/01/2024    Procedure: EGD (ESOPHAGOGASTRODUODENOSCOPY);  Surgeon: Zbigniew Dougherty MD;  Location: [facility];  Service: Endoscopy;  Laterality: N/A;    FRACTURE SURGERY Left 2008    open  radius/ulna fracture    HYSTERECTOMY      TANIA/ovaries remain--fibroids- in her late 30's / early 40's    INJECTION, SPINE, LUMBOSACRAL, TRANSFORAMINAL APPROACH Left 11/22/2024    Procedure: left L3/4 and L4/5 TFESI;  Surgeon: Denver Cordon MD;  Location: [facility] PAIN MANAGEMENT;  Service: Pain Management;  Laterality: Left;  20 mins ASA ok  Brilinta 5 days    IVUS, CORONARY  05/17/2024    Procedure: IVUS, Coronary;  Surgeon: Miguel Jacob MD;  Location: [facility] CATH LAB;  Service: Cardiology;;    STENT, DRUG ELUTING, MULTI VESSEL, CORONARY  05/17/2024    Procedure: Stent, Drug Eluting, Multi Vessel, Coronary;  Surgeon: Miguel Jacob MD;  Location: [facility] CATH LAB;  Service: Cardiology;;     Social History     Socioeconomic History    Marital status:    Tobacco Use    Smoking status: Never     Passive exposure: Never    Smokeless tobacco: Never   Substance and Sexual Activity    Alcohol use: Not Currently     Comment: 1-2 glasses wine weekly    Drug use: No    Sexual activity: Yes     Partners: Male     Birth control/protection: None     Social Drivers of Health     Financial Resource Strain: Low Risk  (3/17/2025)    Overall Financial Resource Strain (CARDIA)     Difficulty of Paying Living Expenses: Not very hard   Food Insecurity: No Food Insecurity (3/17/2025)    Hunger Vital Sign     Worried About Running Out of Food in the Last Year: Never true     Ran Out of Food in the Last Year: Never true   Transportation Needs: No Transportation Needs (11/25/2024)    PRAPARE - Transportation     Lack of Transportation (Medical): No     Lack of Transportation (Non-Medical): No   Physical Activity: Inactive (3/17/2025)    Exercise Vital Sign     Days of Exercise per Week: 0 days     Minutes of Exercise per Session: 0 min   Stress: No Stress Concern Present (3/17/2025)    [employer] - Occupational Stress Questionnaire     Feeling of Stress : Only a little   Recent Concern: Stress -  Stress Concern Present (2/11/2025)    [employer] - Occupational Stress Questionnaire     Feeling of Stress : To some extent   Housing Stability: Low Risk  (3/17/2025)    Housing Stability Vital Sign     Unable to Pay for Housing in the Last Year: No     Homeless in the Last Year: No     Family History   Problem Relation Name Age of Onset    Heart failure Mother copd     Macular degeneration Mother copd     Heart disease Mother copd     COPD Mother copd     Diabetes Sister      Arthritis Sister          rheumatoid arthritis    COPD Father      No Known Problems Brother      No Known Problems Son Saad     No Known Problems Daughter Jordyn     No Known Problems Sister      No Known Problems Sister      No Known Problems Sister      No Known Problems Brother      No Known Problems Brother      No Known Problems Brother      No Known Problems Brother      No Known Problems Brother      No Known Problems Daughter Reyes     No Known Problems Son Arie     Breast cancer Neg Hx      Ovarian cancer Neg Hx      Amblyopia Neg Hx      Blindness Neg Hx      Cancer Neg Hx      Cataracts Neg Hx      Glaucoma Neg Hx      Hypertension Neg Hx      Retinal detachment Neg Hx      Strabismus Neg Hx      Stroke Neg Hx      Thyroid disease Neg Hx      Cervical cancer Neg Hx      Endometrial cancer Neg Hx      Vaginal cancer Neg Hx      Colon cancer Neg Hx         Review of patient's allergies indicates:   Allergen Reactions    Sulfa (sulfonamide antibiotics) Hives     Other reaction(s): Anaphylaxis    Itching     Shortness of breath       Current Outpatient Medications   Medication Sig    amoxicillin (AMOXIL) 875 MG tablet Take 1 tablet (875 mg total) by mouth 2 (two) times daily.    aspirin 81 MG Chew Take 81 mg by mouth once daily. Not chewable    atorvastatin (LIPITOR) 40 MG tablet Take 1 tablet (40 mg total) by mouth every evening.    calcium-vitamin D3 500 mg(1,250mg) -200 unit per tablet Take 1 tablet by  mouth 2 (two) times daily with meals.    conjugated estrogens (PREMARIN) vaginal cream Place 0.5 g vaginally once daily.    diclofenac sodium (VOLTAREN ARTHRITIS PAIN) 1 % Gel Apply 2 g topically daily as needed (Pain).    ezetimibe (ZETIA) 10 mg tablet Take 1 tablet (10 mg total) by mouth once daily.    lanolin/mineral oil/petrolatum (ARTIFICIAL TEARS OPHT) Place 1 drop into both eyes once daily.    metoprolol succinate (TOPROL-XL) 25 MG 24 hr tablet Take 0.5 tablets (12.5 mg total) by mouth once daily.    multivitamin (THERAGRAN) per tablet Take 1 tablet by mouth once daily.    nitroGLYCERIN (NITROSTAT) 0.4 MG SL tablet Place 1 tablet (0.4 mg total) under the tongue every 5 (five) minutes as needed for Chest pain.    pantoprazole (PROTONIX) 40 MG tablet Take 1 tablet (40 mg total) by mouth 2 (two) times daily.    pilocarpine (SALAGEN) 5 MG Tab Take 1 tablet (5 mg total) by mouth once daily.    sodium chloride (SALINE MIST NASL) 1 spray by Each Nostril route daily as needed (Congestion).    teriparatide (FORTEO) 20 mcg/dose (600mcg/2.4mL) PnIj Inject 0.08 mLs (20 mcg total) into the skin once daily.    ticagrelor (BRILINTA) 90 mg tablet Take 1 tablet (90 mg total) by mouth 2 (two) times daily.    traMADoL (ULTRAM) 50 mg tablet Take 1 tablet (50 mg total) by mouth every 8 (eight) hours as needed for Pain.    valsartan (DIOVAN) 80 MG tablet Take 1 tablet (80 mg total) by mouth once daily.    vitamin A-vitamin C-vit E-min Tab Take 1 tablet by mouth once daily.     No current facility-administered medications for this visit.     Facility-Administered Medications Ordered in Other Visits   Medication    lactated ringers infusion       REVIEW OF SYSTEMS:    GENERAL:  current fevers or chills, recent use of antibiotics denies.  HEENT:  History of migraines/headaches: denies, History of major throat surgery: denies  RESPIRATORY:  History of home oxygen or pulmonary hypertension/severe breathing dysfunction:  "denies  CARDIOVASCULAR:  Hx of palpitations/rhythm problems: reports Hx of Heart Attacks/Surgery: reports Stents placed in May 2024  GI:  Recent abdominal discomfort or recent change in bowel habits denies  MUSCULOSKELETAL:  See HPI.  SKIN:  unhealed wounds or rashes: denies  PSYCH: major psychiatric history or recent psychosocial stressors denies  HEMATOLOGY/LYMPHOLOGY:  Hx of prolonged bleeding, Hx of Blood thinner usage: reports brilinta ; reason: stents in May 2024  NEURO:   history of seizures, strokes, chronic/old weakness (such as paralysis or paresis of any body part): denies  All other reviewed and negative other than HPI.    OBJECTIVE:    BP (!) 186/94 (BP Location: Right arm, Patient Position: Sitting)   Pulse 62   Ht 5' 1" (1.549 m)   Wt 45.9 kg (101 lb 3.1 oz)   BMI 19.12 kg/m²     PHYSICAL EXAMINATION:  General appearance: Well appearing, in no acute distress, alert and appropriately communicative.  Psych:  Mood and affect appropriate.  Skin: Skin color, texture, turgor normal, no rashes or lesions, in both upper and lower body for exposed skin.  Head/face:  Atraumatic, normocephalic.  Cor: regular rate  Pulm: non-labored breathing  GI: Abdomen non-distended and non-tender.  Back: Straight leg raising in the sitting and supine positions is negative. pain to palpation over the spine or paraspinal muscles. Pain to percussion. Pain with extension and facet loading  Extremities: No deformities, significant lymphedema, or skin discoloration. No significant open wounds. No major amputations.  Musculoskeletal: hip, sacroiliac and knee provocative maneuvers are negative. Bilateral upper and lower extremity strength is normal and symmetric.  No atrophy or tone abnormalities are noted.  Neuro: Bilateral upper and lower extremity coordination and muscle stretch reflexes abnormalities noted: none.  Monae and/or Clonus: negative; loss of sensation to light touch: none  Gait: walker    CMP  Sodium   Date Value " Walk in Ref Range Status   03/27/2025 142 136 - 145 mmol/L Final   03/16/2025 142 136 - 145 mmol/L Final     Potassium   Date Value Ref Range Status   03/27/2025 3.8 3.5 - 5.1 mmol/L Final   03/16/2025 3.1 (L) 3.5 - 5.1 mmol/L Final     Chloride   Date Value Ref Range Status   03/27/2025 106 95 - 110 mmol/L Final   03/16/2025 105 95 - 110 mmol/L Final     CO2   Date Value Ref Range Status   03/27/2025 29 23 - 29 mmol/L Final   03/16/2025 27 23 - 29 mmol/L Final     Glucose   Date Value Ref Range Status   03/27/2025 99 70 - 110 mg/dL Final   03/16/2025 125 (H) 70 - 110 mg/dL Final     BUN   Date Value Ref Range Status   03/27/2025 17 8 - 23 mg/dL Final     Creatinine   Date Value Ref Range Status   03/27/2025 0.8 0.5 - 1.4 mg/dL Final     Calcium   Date Value Ref Range Status   03/27/2025 10.4 8.7 - 10.5 mg/dL Final   03/16/2025 10.7 (H) 8.7 - 10.5 mg/dL Final     Total Protein   Date Value Ref Range Status   03/16/2025 7.2 6.0 - 8.4 g/dL Final     Albumin   Date Value Ref Range Status   03/27/2025 3.8 3.5 - 5.2 g/dL Final   03/16/2025 3.9 3.5 - 5.2 g/dL Final     Total Bilirubin   Date Value Ref Range Status   03/16/2025 0.3 0.1 - 1.0 mg/dL Final     Comment:     For infants and newborns, interpretation of results should be based  on gestational age, weight and in agreement with clinical  observations.    Premature Infant recommended reference ranges:  Up to 24 hours.............<8.0 mg/dL  Up to 48 hours............<12.0 mg/dL  3-5 days..................<15.0 mg/dL  6-29 days.................<15.0 mg/dL       Alkaline Phosphatase   Date Value Ref Range Status   03/16/2025 101 40 - 150 U/L Final     AST   Date Value Ref Range Status   03/16/2025 26 10 - 40 U/L Final     ALT   Date Value Ref Range Status   03/16/2025 17 10 - 44 U/L Final     Anion Gap   Date Value Ref Range Status   03/27/2025 7 (L) 8 - 16 mmol/L Final     eGFR   Date Value Ref Range Status   03/27/2025 >60 >60 mL/min/1.73/m2 Final     Comment:     Estimated  GFR calculated using the CKD-EPI creatinine (2021) equation.   03/16/2025 >60.0 >60 mL/min/1.73 m^2 Final     ASSESSMENT: 87 y.o. year old female with lower back pain, consistent with:    1. Degeneration of intervertebral disc of lumbar region with discogenic back pain and lower extremity pain  Procedure Order to Pain Management    Ambulatory referral/consult to Neurology      2. Age-related osteoporosis with current pathological fracture, initial encounter  Procedure Order to Pain Management    Ambulatory referral/consult to Neurology        IMPRESSION: Betzy Cooley presents today for lower back pain with some radiation down the left leg. History and physical exam are consistent with axial lower back pain/facetogenic pain and lumbar radiculopathy.  My independent interpretation of the imaging is consistent with L1 compression fracture (vertebra plana) with retropulsion and lumbar degenerative disc disease with multilevel foraminal stenosis, L3 compression fracture, as well as lumbar facet spondylosis.  She has recurrent axial lower back pain (both sides) which she felt improved after her last transforaminal epidural injection. She is interested in repeating this again.    PLAN:   - I have stressed the importance of physical activity and a home exercise plan to help with pain and improve health.  - Patient can continue with medications for now since they are providing benefits, using them appropriately, and without side effects.  - continue physical therapy for lower back pain  - schedule for bilateral L3/4 transforaminal epidural steroid injection  - ok to take tylenol 1000mg every 8 hours as needed; < 3000mg total in a day  - continue robaxin 500mg every 8 hours as needed for muscular pain  - RTC 2 weeks after epidural  - Counseled patient regarding the importance of activity modification and physical therapy.    The above plan and management options were discussed at length with patient. Patient is in  agreement with the above and verbalized understanding. It will be communicated with the referring physician via electronic record, fax, or mail.    Denver Cordon  05/06/2025